# Patient Record
Sex: FEMALE | Race: WHITE | NOT HISPANIC OR LATINO
[De-identification: names, ages, dates, MRNs, and addresses within clinical notes are randomized per-mention and may not be internally consistent; named-entity substitution may affect disease eponyms.]

---

## 2017-04-19 ENCOUNTER — APPOINTMENT (OUTPATIENT)
Dept: BREAST CENTER | Facility: CLINIC | Age: 57
End: 2017-04-19

## 2017-04-19 VITALS
HEART RATE: 64 BPM | WEIGHT: 135 LBS | HEIGHT: 58 IN | SYSTOLIC BLOOD PRESSURE: 123 MMHG | DIASTOLIC BLOOD PRESSURE: 75 MMHG | BODY MASS INDEX: 28.34 KG/M2

## 2017-06-21 ENCOUNTER — RESULT REVIEW (OUTPATIENT)
Age: 57
End: 2017-06-21

## 2018-04-18 ENCOUNTER — APPOINTMENT (OUTPATIENT)
Dept: BREAST CENTER | Facility: CLINIC | Age: 58
End: 2018-04-18
Payer: COMMERCIAL

## 2018-04-18 VITALS
SYSTOLIC BLOOD PRESSURE: 132 MMHG | HEIGHT: 59 IN | DIASTOLIC BLOOD PRESSURE: 80 MMHG | BODY MASS INDEX: 27.21 KG/M2 | HEART RATE: 70 BPM | WEIGHT: 135 LBS

## 2018-04-18 PROCEDURE — 99213 OFFICE O/P EST LOW 20 MIN: CPT

## 2018-07-02 ENCOUNTER — RESULT REVIEW (OUTPATIENT)
Age: 58
End: 2018-07-02

## 2018-08-07 ENCOUNTER — OTHER (OUTPATIENT)
Age: 58
End: 2018-08-07

## 2019-02-26 ENCOUNTER — APPOINTMENT (OUTPATIENT)
Dept: ENDOCRINOLOGY | Facility: CLINIC | Age: 59
End: 2019-02-26
Payer: COMMERCIAL

## 2019-02-26 ENCOUNTER — RX RENEWAL (OUTPATIENT)
Age: 59
End: 2019-02-26

## 2019-02-26 VITALS
SYSTOLIC BLOOD PRESSURE: 140 MMHG | DIASTOLIC BLOOD PRESSURE: 74 MMHG | BODY MASS INDEX: 30.04 KG/M2 | OXYGEN SATURATION: 98 % | HEIGHT: 59 IN | WEIGHT: 149 LBS | HEART RATE: 78 BPM

## 2019-02-26 PROCEDURE — 99244 OFF/OP CNSLTJ NEW/EST MOD 40: CPT

## 2019-02-26 RX ORDER — DEXLANSOPRAZOLE 60 MG/1
60 CAPSULE, DELAYED RELEASE ORAL
Refills: 0 | Status: DISCONTINUED | COMMUNITY
End: 2019-02-26

## 2019-02-26 NOTE — HISTORY OF PRESENT ILLNESS
[Calcium (dietary)] : calcium from their regular diet [Vitamin D (supplements)] : Vitamin D as a dietary supplement [Patient taking Meds Correctly] : Patient is taking meds correctly [Regular Dental Follow-Up] : regular dental follow-up [FreeTextEntry1] : Ms. JIM is a 58 year old female being referred today for osteoporosis. \par \par Pt had h/o breast CA in 1996 and a recurrence in 2005, s/p bilat mastectomy. No chemo, RT, or AI. BMD August 2018 reported as decreased vs. 2013; spine:-2.8 (-12.6%) fem neck: -2.2 tot hip:-1.5. No h/o fx. No fhx of osteoporosis. She was not offered osteoporosis rx by Dr. Harmeet Jasso; was told to increase her exercise and dietary Ca intake and then saw Dr. Dann Garner who offered her oral osteoporosis rx but she was fearful due to her brother's h/o esophageal CA. Secondary w/u was negative. Former smoker, stopped at age 32. Pt gets moderate amounts of dietary Ca (yogurt). \par \par Pt had gestational diabetes, now has prediabetes. She just recently started exercising daily. \par \par Pt had a uterine tumor when she was pregnant with her son. Pt had an endometrial ablation in 2009.  [Taking Steroids] : no past or present history of taking steroids [Kidney Stones] : no history of kidney stones [Current Smoking___(ppd)] : not currently smoking [High Fall Risk] : no fall risk [Frequent Falls] : no frequent falls [Uses a Walker/Cane] : no use of a walker/cane [Family History of Osteoporosis] : no family history of osteoporosis [Family History of Hip Fracture] : no family history of hip fracture [Hyperparathyroidism] : no hyperparathyroidism [History of Radiation Therapy] : no history of radiation therapy [History of Blood Clots] : no history of blood clots [Previous Fragility Fracture] : no previous fragility fracture

## 2019-02-26 NOTE — ASSESSMENT
[Bisphosphonate Therapy] : Risks  and benefits of bisphosphonate therapy were  discussed with the patient including gastroesophageal irritation, osteonecrosis of the jaw, and atypical femur fractures, and acute phase reaction [Denosumab Therapy] : Risks  and benefits of denosumab therapy were discussed with the patient including eczema, cellulitis, osteonecrosis of the jaw and atypical femur fractures [Raloxifene Therapy] : Risks and benefits of Raloxifene therapy were discussed with the patient including blood clots, hot flashes, and cramps [FreeTextEntry1] : 58 year old female with osteoporosis. \par \par Pt had decreasing BMD from 2013 to 2018, consistent with osteoporosis in her spine. She has no h/o fx, no fhx of osteoporosis , and secondary w/u with Dr. Garner was neg. Pt was offered oral bisphosphonates but did not want to start them as her brother had esophageal CA (not a side effect of osteoporosis rx). Hx is notable for breast CA in 1996 and 2005. She was treated with bilat mastectomy. Pt gets moderate amounts of dietary Ca and recently started exercising regularly. \par \par Pt is at increased risk for fx. I discussed that weight bearing exercise, cardiovascular activities, and diet are beneficial to overall health, but are not adequate for bone density increase or alternative to osteoporosis medication. I recommend pt start medical treatment to stop bone loss, increase BMD, and thus reduce risk of future fx. As she is fearful of GI irritation she can consider Atelvia, IV Reclast, or Prolia for this pt. Risks and benefits discussed to include flu like sx on first dose of Reclast, long term risk of ONJ or atypical femur fx. With Reclast and Atelvia, but not with Prolia, pt would be candidate for a drug holiday <5 years to decrease the future risk of these side effects. Recent research with Reclast looked at 18 month cycles of dosing instead of 12 months, with 6 year safety data, further there was reduction in CA. Due to h/o breast CA and low spinal bone density pt can also consider Evista. Risks and benefits discussed to include risk of hot flashes, leg cramps and more serious blood clots. Evista has been shown to improve spine bone density while acting as a prevention against breast CA. Pt states she is fearful of needles (Reclast). \par \par All questions answered. Pt understands and agrees to Atelvia. Prescription sent. \par \par f/u in 4 mons.

## 2019-02-26 NOTE — END OF VISIT
[FreeTextEntry3] : I, Gabrielle Valles, authored this note working as a medical scribe for Dr. Mcleod.  02/26/2019.  1:15PM. \par This note was authored by Gabrielle Valles working as medical scribe for me. I have reviewed, edited, and revised the note as needed. I am in agreement with the exam findings, imaging findings, and treatment plan.  Roddy Mcleod MD

## 2019-02-26 NOTE — CONSULT LETTER
[Consult Letter:] : I had the pleasure of evaluating your patient, [unfilled]. [Please see my note below.] : Please see my note below. [Consult Closing:] : Thank you very much for allowing me to participate in the care of this patient.  If you have any questions, please do not hesitate to contact me. [Sincerely,] : Sincerely, [Dear  ___] : Dear  [unfilled], [DrAlber  ___] : Dr. MCINTYRE [DrAlber ___] : Dr. MCINTYRE [FreeTextEntry2] : Lorie Phelan MD\par [address]\[place] [address]

## 2019-04-17 ENCOUNTER — APPOINTMENT (OUTPATIENT)
Dept: BREAST CENTER | Facility: CLINIC | Age: 59
End: 2019-04-17
Payer: COMMERCIAL

## 2019-04-17 VITALS
BODY MASS INDEX: 28.22 KG/M2 | SYSTOLIC BLOOD PRESSURE: 144 MMHG | HEIGHT: 59 IN | WEIGHT: 140 LBS | DIASTOLIC BLOOD PRESSURE: 72 MMHG | HEART RATE: 70 BPM

## 2019-04-17 DIAGNOSIS — I89.0 OTHER POSTPROCEDURAL ENDOCRINE AND METABOLIC COMPLICATIONS AND DISORDERS: ICD-10-CM

## 2019-04-17 DIAGNOSIS — E89.89 OTHER POSTPROCEDURAL ENDOCRINE AND METABOLIC COMPLICATIONS AND DISORDERS: ICD-10-CM

## 2019-04-17 PROCEDURE — 99213 OFFICE O/P EST LOW 20 MIN: CPT

## 2019-04-17 NOTE — PHYSICAL EXAM
[Sclera nonicteric] : sclera nonicteric [No Cervical Adenopathy] : no cervical adenopathy [No Supraclavicular Adenopathy] : no supraclavicular adenopathy [Supple] : supple [Clear to Auscultation Bilat] : clear to auscultation bilaterally [No dominant masses] : no dominant masses in right breast  [No Thyromegaly] : no thyromegaly [No dominant masses] : no dominant masses left breast [Soft] : abdomen soft [No Axillary Lymphadenopathy] : no right axillary lymphadenopathy [Not Tender] : non-tender [No Palpable Masses] : no abdominal mass palpated [de-identified] : Mastectomy scar. [de-identified] : Mastectomy scar. Mild tenderness in pectoral muscle at anterior axillary region. [de-identified] : Very mild right arm edema.

## 2019-04-17 NOTE — PAST MEDICAL HISTORY
[Menarche Age ____] : age at menarche was [unfilled] [Total Preg ___] : G[unfilled] [Live Births ___] : P[unfilled]  [Age At Live Birth ___] : Age at live birth: [unfilled]

## 2019-04-17 NOTE — HISTORY OF PRESENT ILLNESS
[FreeTextEntry1] : This is a 58 year old  female who has a history of right breast DCIS with microinvasion in 1996 at age 35 treated with a modified radical mastectomy. She had a left prophylactic mastectomy with a right scar revision in 2006.  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  \par \par She complains of soreness in the left mastectomy area.  She was doing a lot of lifting last week- replacing mulch with stones.  The soreness started then.  Her right arm has gotten somewhat swollen since then also.  She has had problems with lymphedema in the past, but she wasn't comfortable wearing a stocking.\par \par

## 2019-04-17 NOTE — CONSULT LETTER
[Dear  ___] : Dear  [unfilled], [Sincerely,] : Sincerely, [Courtesy Letter:] : I had the pleasure of seeing your patient, [unfilled], in my office today. [DrAlber ___] : Dr. MCINTYRE [DrAlber  ___] : Dr. MCINTYRE

## 2019-07-03 ENCOUNTER — APPOINTMENT (OUTPATIENT)
Dept: ENDOCRINOLOGY | Facility: CLINIC | Age: 59
End: 2019-07-03
Payer: COMMERCIAL

## 2019-07-03 VITALS
DIASTOLIC BLOOD PRESSURE: 70 MMHG | HEIGHT: 59 IN | OXYGEN SATURATION: 98 % | SYSTOLIC BLOOD PRESSURE: 132 MMHG | BODY MASS INDEX: 29.23 KG/M2 | WEIGHT: 145 LBS | HEART RATE: 68 BPM

## 2019-07-03 DIAGNOSIS — E04.1 NONTOXIC SINGLE THYROID NODULE: ICD-10-CM

## 2019-07-03 PROCEDURE — 99214 OFFICE O/P EST MOD 30 MIN: CPT

## 2019-07-03 NOTE — END OF VISIT
[FreeTextEntry3] : I, Cora Garcia, authored this note working as a medical scribe for Dr. Mcleod.  07/03/2019. 11:45AM. This note was authored by the medical scribe for me. I have reviewed, edited, and revised the note as needed. I am in agreement with the exam findings, imaging findings, and treatment plan.  Roddy Mcleod MD

## 2019-07-03 NOTE — PROCEDURE
[FreeTextEntry1] : Thyroid US - 07/03/2019\par Left: isthmus 3 mm nodule\par Right: medial thyroid cyst 4 x 3 x 6 mm, 2nd 5 x 4 x 6

## 2019-07-03 NOTE — ASSESSMENT
[Bisphosphonate Therapy] : Risks  and benefits of bisphosphonate therapy were  discussed with the patient including gastroesophageal irritation, osteonecrosis of the jaw, and atypical femur fractures, and acute phase reaction [Denosumab Therapy] : Risks  and benefits of denosumab therapy were discussed with the patient including eczema, cellulitis, osteonecrosis of the jaw and atypical femur fractures [Raloxifene Therapy] : Risks and benefits of Raloxifene therapy were discussed with the patient including blood clots, hot flashes, and cramps [FreeTextEntry1] : 58 year old female with osteoporosis and thyroid nodules.\par \par 1. Osteoporosis: Pt had decreasing BMD from 2013 to 2018, consistent with osteoporosis in her spine. She has no h/o fx, no fhx of osteoporosis , and secondary w/u with Dr. Garner was neg. Pt was offered oral bisphosphonates but did not want to start them as her brother had esophageal CA (not a side effect of osteoporosis rx). Hx is notable for breast CA in 1996 and 2005. She was treated with bilat mastectomy. Pt gets moderate amounts of dietary Ca and recently started exercising regularly. At last visit 2/2019, pt agreed to start taking Atelvia. Risks and benefits discussed. However, pt did not start taking Atelvia because she had recent dental work done and was told to not begin the rx. Pt wad advised by dentist to wait 3 months after dental procedures were completed. Risks and benefits discussed. Pt will start taking Atelvia in August. \par \par All questions answered. Pt understands and agrees to Atelvia. Prescription sent. \par \par 2. Thyroid Nodules: Pt was told to come in for further evaluation of thyroid nodules found at routine check up. No hx of exposure to RT or drugs that induce thyroid disease such as lithium or amiodarone. No FHx of thyroid disease. US done today shows trivial thyroid nodules bilaterally. I do not recommend further action at this time other than simple observation. \par \par f/u in 6 months.

## 2019-07-03 NOTE — PHYSICAL EXAM
[Alert] : alert [No Acute Distress] : no acute distress [Well Developed] : well developed [Well Nourished] : well nourished [Normal Sclera/Conjunctiva] : normal sclera/conjunctiva [No Proptosis] : no proptosis [EOMI] : extra ocular movement intact [Normal Oropharynx] : the oropharynx was normal [No Thyroid Nodules] : there were no palpable thyroid nodules [Thyroid Not Enlarged] : the thyroid was not enlarged [No Accessory Muscle Use] : no accessory muscle use [Clear to Auscultation] : lungs were clear to auscultation bilaterally [No Respiratory Distress] : no respiratory distress [Normal Bowel Sounds] : normal bowel sounds [Regular Rhythm] : with a regular rhythm [Normal Rate] : heart rate was normal  [Normal S1, S2] : normal S1 and S2 [Soft] : abdomen soft [Not Distended] : not distended [Not Tender] : non-tender [Post Cervical Nodes] : posterior cervical nodes [Anterior Cervical Nodes] : anterior cervical nodes [Normal] : normal and non tender [No Stigmata of Cushings Syndrome] : no stigmata of cushings syndrome [No Spinal Tenderness] : no spinal tenderness [Spine Straight] : spine straight [No Rash] : no rash [Normal Gait] : normal gait [Normal Strength/Tone] : muscle strength and tone were normal [Normal Reflexes] : deep tendon reflexes were 2+ and symmetric [No Tremors] : no tremors [Oriented x3] : oriented to person, place, and time [Acanthosis Nigricans] : no acanthosis nigricans

## 2019-07-03 NOTE — HISTORY OF PRESENT ILLNESS
[Calcium (dietary)] : calcium from their regular diet [Vitamin D (supplements)] : Vitamin D as a dietary supplement [Patient taking Meds Correctly] : Patient is taking meds correctly [Regular Dental Follow-Up] : regular dental follow-up [FreeTextEntry1] : Ms. JIM is a 58 year old female with osteoporosis and thyroid nodules. \par \par Pt had h/o breast CA in 1996 and a recurrence in 2005, s/p bilat mastectomy. No chemo, RT, or AI. BMD August 2018 reported as decreased vs. 2013; spine:-2.8 (-12.6%) fem neck: -2.2 tot hip:-1.5. No h/o fx. No fhx of osteoporosis. She was not offered osteoporosis rx by Dr. Harmeet Jasso; was told to increase her exercise and dietary Ca intake and then saw Dr. Dann Garner who offered her oral osteoporosis rx but she was fearful due to her brother's h/o esophageal CA. Secondary w/u was negative. Former smoker, stopped at age 32. Pt gets moderate amounts of dietary Ca (yogurt). At last visit 2/2019, pt agreed to start taking Atelvia. However, pt did not start taking Atelvia because she had recent dental work done and was told to not begin the rx. Reports that she had an infection after the procedure. Pt wad advised by dentist to wait 3 months after dental procedures were completed.\par \par Pt was told of thyroid nodules at a routine check-up with her PCP. Was told to come here for further evaluation. \par \par Pt had gestational diabetes, now has prediabetes. Tries to exercise regularly.  \par \par Pt had a uterine tumor when she was pregnant with her son. Pt had an endometrial ablation in 2009.  [Taking Steroids] : no past or present history of taking steroids [Current Smoking___(ppd)] : not currently smoking [Kidney Stones] : no history of kidney stones [High Fall Risk] : no fall risk [Frequent Falls] : no frequent falls [Uses a Walker/Cane] : no use of a walker/cane [Family History of Osteoporosis] : no family history of osteoporosis [History of Radiation Therapy] : no history of radiation therapy [Family History of Hip Fracture] : no family history of hip fracture [Hyperparathyroidism] : no hyperparathyroidism [History of Blood Clots] : no history of blood clots [Previous Fragility Fracture] : no previous fragility fracture

## 2019-07-18 ENCOUNTER — APPOINTMENT (OUTPATIENT)
Dept: OBGYN | Facility: CLINIC | Age: 59
End: 2019-07-18
Payer: COMMERCIAL

## 2019-07-18 ENCOUNTER — RECORD ABSTRACTING (OUTPATIENT)
Age: 59
End: 2019-07-18

## 2019-07-18 VITALS
BODY MASS INDEX: 28.22 KG/M2 | WEIGHT: 140 LBS | DIASTOLIC BLOOD PRESSURE: 72 MMHG | SYSTOLIC BLOOD PRESSURE: 118 MMHG | HEIGHT: 59 IN

## 2019-07-18 DIAGNOSIS — Z80.0 FAMILY HISTORY OF MALIGNANT NEOPLASM OF DIGESTIVE ORGANS: ICD-10-CM

## 2019-07-18 DIAGNOSIS — Z92.89 PERSONAL HISTORY OF OTHER MEDICAL TREATMENT: ICD-10-CM

## 2019-07-18 DIAGNOSIS — Z90.10 ACQUIRED ABSENCE OF UNSPECIFIED BREAST AND NIPPLE: ICD-10-CM

## 2019-07-18 DIAGNOSIS — Z86.59 PERSONAL HISTORY OF OTHER MENTAL AND BEHAVIORAL DISORDERS: ICD-10-CM

## 2019-07-18 DIAGNOSIS — Z87.39 PERSONAL HISTORY OF OTHER DISEASES OF THE MUSCULOSKELETAL SYSTEM AND CONNECTIVE TISSUE: ICD-10-CM

## 2019-07-18 DIAGNOSIS — N85.01 BENIGN ENDOMETRIAL HYPERPLASIA: ICD-10-CM

## 2019-07-18 DIAGNOSIS — Z85.3 PERSONAL HISTORY OF MALIGNANT NEOPLASM OF BREAST: ICD-10-CM

## 2019-07-18 DIAGNOSIS — Z83.3 FAMILY HISTORY OF DIABETES MELLITUS: ICD-10-CM

## 2019-07-18 DIAGNOSIS — Z91.89 OTHER SPECIFIED PERSONAL RISK FACTORS, NOT ELSEWHERE CLASSIFIED: ICD-10-CM

## 2019-07-18 LAB
BILIRUB UR QL STRIP: NORMAL
CLARITY UR: CLEAR
COLLECTION METHOD: NORMAL
CYTOLOGY CVX/VAG DOC THIN PREP: NORMAL
DATE COLLECTED: NORMAL
GLUCOSE UR-MCNC: NORMAL
HCG UR QL: 0.2 EU/DL
HEMOCCULT SP1 STL QL: NEGATIVE
HGB UR QL STRIP.AUTO: NORMAL
KETONES UR-MCNC: NORMAL
LEUKOCYTE ESTERASE UR QL STRIP: NORMAL
NITRITE UR QL STRIP: NORMAL
PH UR STRIP: 6
PROT UR STRIP-MCNC: NORMAL
QUALITY CONTROL: YES
SP GR UR STRIP: 1.02

## 2019-07-18 PROCEDURE — 82270 OCCULT BLOOD FECES: CPT

## 2019-07-18 PROCEDURE — 81003 URINALYSIS AUTO W/O SCOPE: CPT | Mod: NC,QW

## 2019-07-18 PROCEDURE — 99396 PREV VISIT EST AGE 40-64: CPT

## 2019-07-18 RX ORDER — RISEDRONATE SODIUM 35 MG/1
35 TABLET, DELAYED RELEASE ORAL
Qty: 3 | Refills: 3 | Status: DISCONTINUED | COMMUNITY
Start: 2019-02-26 | End: 2019-07-18

## 2019-07-18 RX ORDER — ALBUTEROL SULFATE 108 UG/1
108 (90 BASE) AEROSOL, METERED RESPIRATORY (INHALATION)
Refills: 0 | Status: DISCONTINUED | COMMUNITY
End: 2019-07-18

## 2019-07-18 RX ORDER — CHROMIUM 200 MCG
1000 TABLET ORAL
Refills: 0 | Status: DISCONTINUED | COMMUNITY
End: 2019-07-18

## 2019-07-18 NOTE — END OF VISIT
[FreeTextEntry3] : All medical record entries made by the Scribe were at my, Dr. Phelan's direction and personally dictated by me on [7/18/19]. I have reviewed the chart and agree that the record accurately reflects my personal performance of the history, physical exam, assessment and plan. I have also personally directed, reviewed, and agreed with the chart.\par I, Latonia Valencia, acted solely as a scribe for Dr. Phelan on this date [7/18/19].

## 2019-07-18 NOTE — HISTORY OF PRESENT ILLNESS
[1 Year Ago] : 1 year ago [Good] : being in good health [Healthy Diet] : a healthy diet [Regular Exercise] : regular exercise [Last Bone Density ___] : Last bone density studies [unfilled] [Last Pap ___] : Last cervical pap smear was [unfilled] [Postmenopausal] : is postmenopausal [Pregnancy History] : pregnancy history: [unknown] : the patient is unsure of the date of her LMP [Menarche Age: ____] : age at menarche was [unfilled] [Sexually Active] : is sexually active [Monogamous] : is monogamous [Male ___] : [unfilled] male [Weight Concerns] : no concerns with her weight [de-identified] : P U/S 10/31/2014

## 2019-07-18 NOTE — PHYSICAL EXAM
[Mass] : no breast mass [Nipple Discharge] : no nipple discharge [Axillary LAD] : no axillary lymphadenopathy [Examination Of The Breasts] : a normal appearance [No Discharge] : no discharge [No Masses] : no breast masses were palpable [Soft] : soft [Tender] : non tender [Oriented x3] : oriented to person, place, and time [Depressed Mood] : not depressed [Flat Affect] : affect not flat [Labia Majora] : labia major [Labia Minora] : labia minora [No Bleeding] : there was no active vaginal bleeding [Pap Obtained] : a Pap smear was performed [Uterine Adnexae] : were not tender and not enlarged [Nl Sphincter Tone] : normal sphincter tone [Awake] : awake [Alert] : alert [Acute Distress] : no acute distress [Normal Appearance] : was normal in appearance [Vulvar Atrophy] : vulvar atrophy [Vulvitis] : vulvitis [Normal] : uterus [Atrophy] : atrophy [Dry Mucosa] : dry mucosa [No Tenderness] : no rectal tenderness [Occult Blood] : occult blood test from digital rectal exam was negative

## 2019-07-19 LAB — HPV HIGH+LOW RISK DNA PNL CVX: NOT DETECTED

## 2019-07-23 LAB — CYTOLOGY CVX/VAG DOC THIN PREP: ABNORMAL

## 2019-08-06 ENCOUNTER — TRANSCRIPTION ENCOUNTER (OUTPATIENT)
Age: 59
End: 2019-08-06

## 2019-10-04 ENCOUNTER — OTHER (OUTPATIENT)
Age: 59
End: 2019-10-04

## 2019-10-07 ENCOUNTER — CHART COPY (OUTPATIENT)
Age: 59
End: 2019-10-07

## 2019-10-25 ENCOUNTER — CHART COPY (OUTPATIENT)
Age: 59
End: 2019-10-25

## 2019-11-21 ENCOUNTER — OTHER (OUTPATIENT)
Age: 59
End: 2019-11-21

## 2020-01-03 ENCOUNTER — APPOINTMENT (OUTPATIENT)
Dept: ENDOCRINOLOGY | Facility: CLINIC | Age: 60
End: 2020-01-03

## 2020-05-15 ENCOUNTER — APPOINTMENT (OUTPATIENT)
Dept: BREAST CENTER | Facility: CLINIC | Age: 60
End: 2020-05-15
Payer: COMMERCIAL

## 2020-05-15 VITALS
HEART RATE: 71 BPM | SYSTOLIC BLOOD PRESSURE: 171 MMHG | HEIGHT: 59 IN | WEIGHT: 143 LBS | BODY MASS INDEX: 28.83 KG/M2 | DIASTOLIC BLOOD PRESSURE: 76 MMHG

## 2020-05-15 DIAGNOSIS — K86.2 CYST OF PANCREAS: ICD-10-CM

## 2020-05-15 DIAGNOSIS — S29.011A STRAIN OF MUSCLE AND TENDON OF FRONT WALL OF THORAX, INITIAL ENCOUNTER: ICD-10-CM

## 2020-05-15 PROCEDURE — 99213 OFFICE O/P EST LOW 20 MIN: CPT

## 2020-05-15 NOTE — HISTORY OF PRESENT ILLNESS
[FreeTextEntry1] : This is a 59 year old  female who has a history of right breast DCIS with microinvasion in 1996 at age 35 treated with a modified radical mastectomy. She had a left prophylactic mastectomy with a right scar revision in 2006.  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  \par \par She complains of soreness in the left mastectomy area/pectoral region.  This has been bothering her since last year.  It is present most of the time and does not seem to be exacerbated by movement or deep inspiration.\par \par

## 2020-05-15 NOTE — CONSULT LETTER
[Dear  ___] : Dear  [unfilled], [Courtesy Letter:] : I had the pleasure of seeing your patient, [unfilled], in my office today. [Sincerely,] : Sincerely, [DrAlber  ___] : Dr. MCINTYRE [DrAlber ___] : Dr. MCINTYRE

## 2020-05-15 NOTE — PHYSICAL EXAM
[Sclera nonicteric] : sclera nonicteric [Supple] : supple [No Supraclavicular Adenopathy] : no supraclavicular adenopathy [No Cervical Adenopathy] : no cervical adenopathy [Clear to Auscultation Bilat] : clear to auscultation bilaterally [No Thyromegaly] : no thyromegaly [No dominant masses] : no dominant masses in right breast  [No dominant masses] : no dominant masses left breast [No Axillary Lymphadenopathy] : no left axillary lymphadenopathy [Not Tender] : non-tender [Soft] : abdomen soft [No Palpable Masses] : no abdominal mass palpated [EOMI] : extra ocular movement intact [de-identified] : Mastectomy scar. [de-identified] : Mastectomy scar. Mild tenderness in pectoral muscle at anterior axillary region. No palpable mass. [de-identified] : Very mild right arm edema.

## 2020-06-01 ENCOUNTER — APPOINTMENT (OUTPATIENT)
Dept: MRI IMAGING | Facility: CLINIC | Age: 60
End: 2020-06-01
Payer: COMMERCIAL

## 2020-06-01 ENCOUNTER — OUTPATIENT (OUTPATIENT)
Dept: OUTPATIENT SERVICES | Facility: HOSPITAL | Age: 60
LOS: 1 days | End: 2020-06-01
Payer: COMMERCIAL

## 2020-06-01 ENCOUNTER — RESULT REVIEW (OUTPATIENT)
Age: 60
End: 2020-06-01

## 2020-06-01 DIAGNOSIS — Z00.8 ENCOUNTER FOR OTHER GENERAL EXAMINATION: ICD-10-CM

## 2020-06-01 PROCEDURE — 72148 MRI LUMBAR SPINE W/O DYE: CPT | Mod: 26

## 2020-06-01 PROCEDURE — 71552 MRI CHEST W/O & W/DYE: CPT

## 2020-06-01 PROCEDURE — 72148 MRI LUMBAR SPINE W/O DYE: CPT

## 2020-06-01 PROCEDURE — A9585: CPT

## 2020-06-01 PROCEDURE — 71552 MRI CHEST W/O & W/DYE: CPT | Mod: 26

## 2020-09-08 ENCOUNTER — APPOINTMENT (OUTPATIENT)
Dept: OBGYN | Facility: CLINIC | Age: 60
End: 2020-09-08
Payer: COMMERCIAL

## 2020-09-08 VITALS
SYSTOLIC BLOOD PRESSURE: 122 MMHG | TEMPERATURE: 97.3 F | DIASTOLIC BLOOD PRESSURE: 78 MMHG | BODY MASS INDEX: 29.64 KG/M2 | HEIGHT: 59 IN | WEIGHT: 147 LBS

## 2020-09-08 DIAGNOSIS — Z01.419 ENCOUNTER FOR GYNECOLOGICAL EXAMINATION (GENERAL) (ROUTINE) W/OUT ABNORMAL FINDINGS: ICD-10-CM

## 2020-09-08 DIAGNOSIS — N76.2 ACUTE VULVITIS: ICD-10-CM

## 2020-09-08 DIAGNOSIS — C50.919 MALIGNANT NEOPLASM OF UNSPECIFIED SITE OF UNSPECIFIED FEMALE BREAST: ICD-10-CM

## 2020-09-08 DIAGNOSIS — Z12.4 ENCOUNTER FOR SCREENING FOR MALIGNANT NEOPLASM OF CERVIX: ICD-10-CM

## 2020-09-08 DIAGNOSIS — Z12.11 ENCOUNTER FOR SCREENING FOR MALIGNANT NEOPLASM OF COLON: ICD-10-CM

## 2020-09-08 DIAGNOSIS — Z12.39 ENCOUNTER FOR OTHER SCREENING FOR MALIGNANT NEOPLASM OF BREAST: ICD-10-CM

## 2020-09-08 DIAGNOSIS — M81.0 AGE-RELATED OSTEOPOROSIS W/OUT CURRENT PATHOLOGICAL FRACTURE: ICD-10-CM

## 2020-09-08 LAB
BILIRUB UR QL STRIP: NORMAL
DATE COLLECTED: NORMAL
GLUCOSE UR-MCNC: NORMAL
HCG UR QL: 0.2 EU/DL
HEMOCCULT SP1 STL QL: NEGATIVE
HGB UR QL STRIP.AUTO: NORMAL
KETONES UR-MCNC: NORMAL
LEUKOCYTE ESTERASE UR QL STRIP: NORMAL
NITRITE UR QL STRIP: NORMAL
PH UR STRIP: 5.5
PROT UR STRIP-MCNC: NORMAL
QUALITY CONTROL: YES
SP GR UR STRIP: 1.01

## 2020-09-08 PROCEDURE — 81003 URINALYSIS AUTO W/O SCOPE: CPT | Mod: QW

## 2020-09-08 PROCEDURE — 82270 OCCULT BLOOD FECES: CPT

## 2020-09-08 PROCEDURE — 99396 PREV VISIT EST AGE 40-64: CPT

## 2020-09-08 RX ORDER — CLOTRIMAZOLE AND BETAMETHASONE DIPROPIONATE 10; .5 MG/G; MG/G
1-0.05 CREAM TOPICAL
Qty: 1 | Refills: 3 | Status: ACTIVE | COMMUNITY
Start: 2020-09-08 | End: 1900-01-01

## 2020-09-08 NOTE — HISTORY OF PRESENT ILLNESS
[Good] : being in good health [Healthy Diet] : a healthy diet [Regular Exercise] : regular exercise [Last Bone Density ___] : Last bone density studies [unfilled] [Last Colonoscopy ___] : Last colonoscopy [unfilled] [Last Pap ___] : Last cervical pap smear was [unfilled] [Postmenopausal] : is postmenopausal [Tubal Occlusion] : has had a tubal occlusion [Pregnancy History] : pregnancy history: [Total Preg ___] : [unfilled] [Full Term ___] : [unfilled] [Premature ___] : [unfilled] [Living ___] : [unfilled] [Monogamous (Male Partner)] : is monogamous with a male partner [Vaginal Itching] : vaginal itching [unknown] : the patient is unsure of the date of her LMP [Menarche Age: ____] : age at menarche was [unfilled] [Sexually Active] : is sexually active [Contraception] : uses contraception [Monogamous] : is monogamous [Tubal Ligation] : has had a tubal ligation [Male ___] : [unfilled] male [Weight Concerns] : no concerns with her weight [Menstrual Problems] : reports normal menses [Night Sweats] : no night sweats [Hot Flashes] : no hot flashes [Mood Changes] : no mood changes [Dyspareunia] : no dyspareunia

## 2020-09-08 NOTE — END OF VISIT
[FreeTextEntry3] : I, Brett Love, acted solely as a scribe for Dr. Phelan on this date 09/08/2020.\par All medical record entries made by the Scribe were at my, Dr. Phelan's direction and personally dictated by me on  09/08/2020. I have reviewed the chart and agree that the record accurately reflects my personal performance of the history, physical exam, assessment and plan. I have also personally directed, reviewed, and agreed with the chart.\par \par

## 2020-09-08 NOTE — PHYSICAL EXAM
[Thyroid Nodule] : no thyroid nodule [LAD] : no lymphadenopathy [Mass] : no breast mass [Goiter] : no goiter [Nipple Discharge] : no nipple discharge [Soft] : soft [Tender] : non tender [Oriented x3] : oriented to person, place, and time [Distended] : not distended [Depressed Mood] : not depressed [Flat Affect] : affect not flat [Labia Majora] : labia major [Labia Minora] : labia minora [Pap Obtained] : a Pap smear was performed [No Bleeding] : there was no active vaginal bleeding [Uterine Adnexae] : were not tender and not enlarged [Nl Sphincter Tone] : normal sphincter tone [Awake] : awake [Alert] : alert [Normal Appearance] : was normal in appearance [Right Breast Absent] : a total mastectomy [Left Breast Absent] : a total mastectomy [___] : a [unfilled] ~Ucm mastectomy scar [Soft, Nontender] : the abdomen was soft and nontender [Atrophy] : atrophy [Normal] : urethra [Dry Mucosa] : dry mucosa [No Tenderness] : no rectal tenderness [Acute Distress] : no acute distress [Axillary Lymph Nodes Enlarged Bilaterally] : no enlarged nodes [Occult Blood] : occult blood test from digital rectal exam was negative

## 2020-09-10 LAB — HPV HIGH+LOW RISK DNA PNL CVX: NOT DETECTED

## 2020-09-11 LAB — CYTOLOGY CVX/VAG DOC THIN PREP: ABNORMAL

## 2020-12-23 PROBLEM — Z01.419 ENCOUNTER FOR ANNUAL ROUTINE GYNECOLOGICAL EXAMINATION: Status: RESOLVED | Noted: 2020-09-08 | Resolved: 2020-12-23

## 2021-06-16 ENCOUNTER — APPOINTMENT (OUTPATIENT)
Dept: BREAST CENTER | Facility: CLINIC | Age: 61
End: 2021-06-16
Payer: COMMERCIAL

## 2021-06-16 VITALS
SYSTOLIC BLOOD PRESSURE: 143 MMHG | HEIGHT: 59 IN | WEIGHT: 145 LBS | HEART RATE: 64 BPM | DIASTOLIC BLOOD PRESSURE: 79 MMHG | BODY MASS INDEX: 29.23 KG/M2

## 2021-06-16 DIAGNOSIS — R19.06 EPIGASTRIC SWELLING, MASS OR LUMP: ICD-10-CM

## 2021-06-16 DIAGNOSIS — C50.211 MALIGNANT NEOPLASM OF UPPER-INNER QUADRANT OF RIGHT FEMALE BREAST: ICD-10-CM

## 2021-06-16 DIAGNOSIS — Z15.09 GENETIC SUSCEPTIBILITY TO OTHER DISEASE: ICD-10-CM

## 2021-06-16 DIAGNOSIS — Z90.13 ACQUIRED ABSENCE OF BILATERAL BREASTS AND NIPPLES: ICD-10-CM

## 2021-06-16 DIAGNOSIS — Z15.89 GENETIC SUSCEPTIBILITY TO OTHER DISEASE: ICD-10-CM

## 2021-06-16 DIAGNOSIS — Z17.0 MALIGNANT NEOPLASM OF UPPER-INNER QUADRANT OF RIGHT FEMALE BREAST: ICD-10-CM

## 2021-06-16 DIAGNOSIS — Z15.01 GENETIC SUSCEPTIBILITY TO OTHER DISEASE: ICD-10-CM

## 2021-06-16 PROCEDURE — 99072 ADDL SUPL MATRL&STAF TM PHE: CPT

## 2021-06-16 PROCEDURE — 99213 OFFICE O/P EST LOW 20 MIN: CPT

## 2021-06-16 RX ORDER — HYDROCHLOROTHIAZIDE 12.5 MG/1
TABLET ORAL DAILY
Refills: 0 | Status: ACTIVE | COMMUNITY

## 2021-06-16 RX ORDER — CLOTRIMAZOLE AND BETAMETHASONE DIPROPIONATE 10; .5 MG/G; MG/G
1-0.05 CREAM TOPICAL TWICE DAILY
Qty: 1 | Refills: 2 | Status: DISCONTINUED | COMMUNITY
Start: 2019-07-18 | End: 2021-06-16

## 2021-06-16 NOTE — HISTORY OF PRESENT ILLNESS
[FreeTextEntry1] : This is a 60 year old  female who has a history of right breast DCIS with microinvasion in 1996 at age 35 treated with a modified radical mastectomy. She had a left prophylactic mastectomy with a right scar revision in 2006.  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  \par \par Last year, she complained of a year history of soreness in the left mastectomy area/pectoral region.  An MRI showed left shoulder bursitis and sternoclavicular arthrosis.  She saw an Orthopedist who did not feel it was significant.  It is relieved by NSAID.\par \par She now complains of a tender swelling in the left xiphoid area that she's noticed for a few weeks.\par \par

## 2021-06-16 NOTE — PHYSICAL EXAM
[EOMI] : extra ocular movement intact [Sclera nonicteric] : sclera nonicteric [Supple] : supple [No Supraclavicular Adenopathy] : no supraclavicular adenopathy [No Cervical Adenopathy] : no cervical adenopathy [No Thyromegaly] : no thyromegaly [Clear to Auscultation Bilat] : clear to auscultation bilaterally [No dominant masses] : no dominant masses in right breast  [No dominant masses] : no dominant masses left breast [No Axillary Lymphadenopathy] : no left axillary lymphadenopathy [Soft] : abdomen soft [Not Tender] : non-tender [No Palpable Masses] : no abdominal mass palpated [de-identified] : Mastectomy scar. [de-identified] : Mastectomy scar. 2-3 cm soft swelling inferior to scar over xiphoid.   [de-identified] : Very mild right arm edema.

## 2021-06-16 NOTE — DATA REVIEWED
[FreeTextEntry1] : Bilateral chest wall MRI 6/1/2020:  Mild to moderate bilateral sternoclavicular joint arthrosis with small bilateral joint effusions.  Partially imaged left subacromial/subdeltoid bursitis.  Consider dedicated imaging.

## 2021-06-16 NOTE — CONSULT LETTER
[Dear  ___] : Dear  [unfilled], [Courtesy Letter:] : I had the pleasure of seeing your patient, [unfilled], in my office today. [Sincerely,] : Sincerely, [DrAlber ___] : Dr. MCINTYRE

## 2025-01-06 NOTE — PROCEDURE
Not at present time [Liquid Base] : liquid base [Cervical Pap Smear] : cervical Pap smear [No Complications] : there were no complications [Tolerated Well] : the patient tolerated the procedure well